# Patient Record
Sex: FEMALE | Race: OTHER | NOT HISPANIC OR LATINO | URBAN - METROPOLITAN AREA
[De-identification: names, ages, dates, MRNs, and addresses within clinical notes are randomized per-mention and may not be internally consistent; named-entity substitution may affect disease eponyms.]

---

## 2017-06-05 ENCOUNTER — OUTPATIENT (OUTPATIENT)
Dept: OUTPATIENT SERVICES | Facility: HOSPITAL | Age: 62
LOS: 1 days | End: 2017-06-05
Payer: COMMERCIAL

## 2017-06-05 DIAGNOSIS — E87.8 OTHER DISORDERS OF ELECTROLYTE AND FLUID BALANCE, NOT ELSEWHERE CLASSIFIED: ICD-10-CM

## 2017-06-05 DIAGNOSIS — D51.8 OTHER VITAMIN B12 DEFICIENCY ANEMIAS: ICD-10-CM

## 2017-06-05 DIAGNOSIS — I10 ESSENTIAL (PRIMARY) HYPERTENSION: ICD-10-CM

## 2017-06-05 DIAGNOSIS — D64.9 ANEMIA, UNSPECIFIED: ICD-10-CM

## 2017-06-05 DIAGNOSIS — E03.9 HYPOTHYROIDISM, UNSPECIFIED: ICD-10-CM

## 2017-06-05 PROCEDURE — 84439 ASSAY OF FREE THYROXINE: CPT

## 2017-06-05 PROCEDURE — 80053 COMPREHEN METABOLIC PANEL: CPT

## 2017-06-05 PROCEDURE — 83718 ASSAY OF LIPOPROTEIN: CPT

## 2017-06-05 PROCEDURE — 85027 COMPLETE CBC AUTOMATED: CPT

## 2017-06-05 PROCEDURE — 82306 VITAMIN D 25 HYDROXY: CPT

## 2017-06-05 PROCEDURE — 83721 ASSAY OF BLOOD LIPOPROTEIN: CPT

## 2017-06-05 PROCEDURE — 82465 ASSAY BLD/SERUM CHOLESTEROL: CPT

## 2017-06-05 PROCEDURE — 84443 ASSAY THYROID STIM HORMONE: CPT

## 2017-06-05 PROCEDURE — 84479 ASSAY OF THYROID (T3 OR T4): CPT

## 2017-07-19 ENCOUNTER — OUTPATIENT (OUTPATIENT)
Dept: OUTPATIENT SERVICES | Facility: HOSPITAL | Age: 62
LOS: 1 days | End: 2017-07-19
Payer: COMMERCIAL

## 2017-07-19 DIAGNOSIS — E11.9 TYPE 2 DIABETES MELLITUS WITHOUT COMPLICATIONS: ICD-10-CM

## 2017-07-19 PROCEDURE — 83036 HEMOGLOBIN GLYCOSYLATED A1C: CPT

## 2017-10-26 ENCOUNTER — OUTPATIENT (OUTPATIENT)
Dept: OUTPATIENT SERVICES | Facility: HOSPITAL | Age: 62
LOS: 1 days | End: 2017-10-26
Payer: COMMERCIAL

## 2017-10-26 DIAGNOSIS — Z00.00 ENCOUNTER FOR GENERAL ADULT MEDICAL EXAMINATION WITHOUT ABNORMAL FINDINGS: ICD-10-CM

## 2017-10-26 DIAGNOSIS — E11.9 TYPE 2 DIABETES MELLITUS WITHOUT COMPLICATIONS: ICD-10-CM

## 2017-10-26 PROCEDURE — 84443 ASSAY THYROID STIM HORMONE: CPT

## 2017-10-26 PROCEDURE — 83721 ASSAY OF BLOOD LIPOPROTEIN: CPT

## 2017-10-26 PROCEDURE — 82746 ASSAY OF FOLIC ACID SERUM: CPT

## 2017-10-26 PROCEDURE — 82306 VITAMIN D 25 HYDROXY: CPT

## 2017-10-26 PROCEDURE — 82607 VITAMIN B-12: CPT

## 2017-10-26 PROCEDURE — 84436 ASSAY OF TOTAL THYROXINE: CPT

## 2017-10-26 PROCEDURE — 84479 ASSAY OF THYROID (T3 OR T4): CPT

## 2017-10-26 PROCEDURE — 83718 ASSAY OF LIPOPROTEIN: CPT

## 2017-10-26 PROCEDURE — 82465 ASSAY BLD/SERUM CHOLESTEROL: CPT

## 2017-10-26 PROCEDURE — 85027 COMPLETE CBC AUTOMATED: CPT

## 2017-10-26 PROCEDURE — 83036 HEMOGLOBIN GLYCOSYLATED A1C: CPT

## 2017-10-26 PROCEDURE — 80048 BASIC METABOLIC PNL TOTAL CA: CPT

## 2019-10-25 ENCOUNTER — EMERGENCY (EMERGENCY)
Facility: HOSPITAL | Age: 64
LOS: 1 days | Discharge: HOME | End: 2019-10-25
Attending: EMERGENCY MEDICINE | Admitting: STUDENT IN AN ORGANIZED HEALTH CARE EDUCATION/TRAINING PROGRAM
Payer: COMMERCIAL

## 2019-10-25 VITALS
WEIGHT: 169.98 LBS | RESPIRATION RATE: 16 BRPM | SYSTOLIC BLOOD PRESSURE: 167 MMHG | HEART RATE: 80 BPM | OXYGEN SATURATION: 96 % | DIASTOLIC BLOOD PRESSURE: 71 MMHG | HEIGHT: 59 IN | TEMPERATURE: 98 F

## 2019-10-25 LAB
APTT BLD: 38.9 SEC — HIGH (ref 28.5–37)
BASOPHILS # BLD AUTO: 0.04 K/UL — SIGNIFICANT CHANGE UP (ref 0–0.2)
BASOPHILS NFR BLD AUTO: 0.5 % — SIGNIFICANT CHANGE UP (ref 0–2)
EOSINOPHIL # BLD AUTO: 0.28 K/UL — SIGNIFICANT CHANGE UP (ref 0–0.5)
EOSINOPHIL NFR BLD AUTO: 3.5 % — SIGNIFICANT CHANGE UP (ref 0–6)
HCT VFR BLD CALC: 32.6 % — LOW (ref 34.5–45)
HGB BLD-MCNC: 10.3 G/DL — LOW (ref 11.5–15.5)
IMM GRANULOCYTES NFR BLD AUTO: 0.4 % — SIGNIFICANT CHANGE UP (ref 0–1.5)
INR BLD: 0.97 RATIO — SIGNIFICANT CHANGE UP (ref 0.88–1.16)
LYMPHOCYTES # BLD AUTO: 1.65 K/UL — SIGNIFICANT CHANGE UP (ref 1–3.3)
LYMPHOCYTES # BLD AUTO: 20.7 % — SIGNIFICANT CHANGE UP (ref 13–44)
MCHC RBC-ENTMCNC: 21.6 PG — LOW (ref 27–34)
MCHC RBC-ENTMCNC: 31.6 GM/DL — LOW (ref 32–36)
MCV RBC AUTO: 68.5 FL — LOW (ref 80–100)
MONOCYTES # BLD AUTO: 0.68 K/UL — SIGNIFICANT CHANGE UP (ref 0–0.9)
MONOCYTES NFR BLD AUTO: 8.5 % — SIGNIFICANT CHANGE UP (ref 2–14)
NEUTROPHILS # BLD AUTO: 5.29 K/UL — SIGNIFICANT CHANGE UP (ref 1.8–7.4)
NEUTROPHILS NFR BLD AUTO: 66.4 % — SIGNIFICANT CHANGE UP (ref 43–77)
NRBC # BLD: 0 /100 WBCS — SIGNIFICANT CHANGE UP (ref 0–0)
PLATELET # BLD AUTO: 209 K/UL — SIGNIFICANT CHANGE UP (ref 150–400)
PROTHROM AB SERPL-ACNC: 11 SEC — SIGNIFICANT CHANGE UP (ref 10–12.9)
RBC # BLD: 4.76 M/UL — SIGNIFICANT CHANGE UP (ref 3.8–5.2)
RBC # FLD: 17.8 % — HIGH (ref 10.3–14.5)
WBC # BLD: 7.97 K/UL — SIGNIFICANT CHANGE UP (ref 3.8–10.5)
WBC # FLD AUTO: 7.97 K/UL — SIGNIFICANT CHANGE UP (ref 3.8–10.5)

## 2019-10-25 PROCEDURE — 99283 EMERGENCY DEPT VISIT LOW MDM: CPT | Mod: 25

## 2019-10-25 PROCEDURE — 93005 ELECTROCARDIOGRAM TRACING: CPT

## 2019-10-25 PROCEDURE — 36415 COLL VENOUS BLD VENIPUNCTURE: CPT

## 2019-10-25 PROCEDURE — 85610 PROTHROMBIN TIME: CPT

## 2019-10-25 PROCEDURE — 85730 THROMBOPLASTIN TIME PARTIAL: CPT

## 2019-10-25 PROCEDURE — 71046 X-RAY EXAM CHEST 2 VIEWS: CPT

## 2019-10-25 PROCEDURE — 99283 EMERGENCY DEPT VISIT LOW MDM: CPT

## 2019-10-25 PROCEDURE — 93010 ELECTROCARDIOGRAM REPORT: CPT

## 2019-10-25 PROCEDURE — 71046 X-RAY EXAM CHEST 2 VIEWS: CPT | Mod: 26

## 2019-10-25 PROCEDURE — 85027 COMPLETE CBC AUTOMATED: CPT

## 2019-10-25 NOTE — ED PROVIDER NOTE - OBJECTIVE STATEMENT
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 states she keeps telling employers that the chemicals make her feel sick but they continue to use them. patient states after she leaves the building her symtpoms improve within 30 minutes. additionally she notes a rash over her arms and legs intermittently. not itchy. saw pmd who has been recommending an allergist as well as a dermatologist which she has not yet done. states she is currently feeling well, but earlier today had symptoms while at the . 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 states she keeps telling employers that the chemicals make her feel sick but they continue to use them. patient states after she leaves the building her symptoms improve within 30 minutes. additionally she notes a rash over her arms and legs intermittently. not itchy. saw pmd who has been recommending an allergist as well as a dermatologist which she has not yet done. states she is currently feeling well, but earlier today had symptoms while at the .

## 2019-10-25 NOTE — ED PROVIDER NOTE - PATIENT PORTAL LINK FT
You can access the FollowMyHealth Patient Portal offered by [facility] by registering at the following website: [email or web address]. By joining [facility]’s FollowMyHealth portal, you will also be able to view your health information using other applications (apps) compatible with our system.

## 2019-10-25 NOTE — ED PROVIDER NOTE - ASSOCIATED PAIN OR INJURY
Alprazolam refill request        for Psychiatric Non-Scheduled (Anti-Anxiety)  Refill Protocol Appointment Criteria  · Appointment scheduled in the past 6 months or in the next 3 months  Recent Visits       Provider Department Primary Dx    8 months ago F
Verbal RX called in.
no discrete location documentation necessary

## 2019-10-25 NOTE — ED PROVIDER NOTE - CLINICAL SUMMARY MEDICAL DECISION MAKING FREE TEXT BOX
63 y/o female presents for sob, palpitations and chest pain while around specific chemicals at work for many months. symptoms resolve when she leaves the building. saw pmd advised see an allergist and dermatologist. well appearing, normal cardiopulmonary exam, rash on extremities that is nonblanching and questionably purpuric. will obtain ekg, cxr and plts/coags, likely needs outpatient follow up - Lyssa Dhaliwal PA-C

## 2019-10-25 NOTE — ED PROVIDER NOTE - PROGRESS NOTE DETAILS
MD spoke with patient regarding results, will recommend outpatient follow up. clinically not volume overloaded despite xray read - Lyssa Dhaliwal PA-C

## 2019-10-25 NOTE — ED ADULT NURSE NOTE - SUICIDE SCREENING QUESTION 3

## 2019-10-25 NOTE — ED PROVIDER NOTE - NSFOLLOWUPCLINICS_GEN_ALL_ED_FT
[facility] - [place]  Dermatology  [address] [place] [address]  Phone: [phone]  Fax: [phone]    [facility] - [place]  Dermatology  [address]  [place] [address]  Phone: [phone]  Fax: [phone]    [facility]  Allergy  [address] [place] [id number]  Phone: [phone]  Fax:   Follow Up Time:

## 2019-10-25 NOTE — ED PROVIDER NOTE - ATTENDING CONTRIBUTION TO CARE
pt with nonspecific SOB and b/l upper extremity chronic rash that she believes is due to cleaning chemical exposure at work.  pt states she is asymptomatic while at home.  imaging, ekg, labs unremarkable.  dc home with outpt derm fu.  pt advised to wear a mask while cleaning exposure due to possibility for pneumonitis if sensitive.

## 2019-10-25 NOTE — ED ADULT TRIAGE NOTE - WEIGHT IN LBS
169.9 Full Thickness Lip Wedge Repair (Flap) Text: Given the location of the defect and the proximity to free margins a full thickness wedge repair was deemed most appropriate.  Using a sterile surgical marker, the appropriate repair was drawn incorporating the defect and placing the expected incisions perpendicular to the vermilion border.  The vermilion border was also meticulously outlined to ensure appropriate reapproximation during the repair.  The area thus outlined was incised through and through with a #15 scalpel blade.  The muscularis and dermis were reaproximated with deep sutures following hemostasis. Care was taken to realign the vermilion border before proceeding with the superficial closure.  Once the vermilion was realigned the superfical and mucosal closure was finished.

## 2019-10-25 NOTE — ED PROVIDER NOTE - NSFOLLOWUPINSTRUCTIONS_ED_ALL_ED_FT
Follow up with your Primary Care Physician within the next 2-3 days  Bring a copy of your test results with you to your appointment  Continue your current medication regimen  Return to the Emergency Room if you experience new or worsening symptoms  avoid triggers for your trouble breathing  Follow up with an allergist  Follow up with a dermatologist regarding your rash Follow up with your Primary Care Physician within the next 2-3 days  Bring a copy of your test results with you to your appointment  Continue your current medication regimen  Return to the Emergency Room if you experience new or worsening symptoms  avoid triggers for your trouble breathing, chest pain, fainting  Follow up with an allergist  Follow up with a dermatologist regarding your rash

## 2019-10-25 NOTE — ED ADULT NURSE NOTE - OBJECTIVE STATEMENT
received pt in bed #6b Pt A&O pt with palpitations, chest pain and shortness of breath for months when shes at work & they use chemicals. .pt also  dry skin/rash x 6 months. Pt was instructed to see allergist & dermatologist by PMD but pt never did received pt in bed #6b Pt A&O pt with palpitations, chest pain and shortness of breath for 6 months when shes at work & they use chemicals. .pt also  dry skin/rash x 6 months. Pt was instructed to see allergist & dermatologist by PMD but pt never did. Pt denies any complaints @ this time States she only has symptoms when she is in the building & cleaning the floors

## 2023-09-11 NOTE — ED ADULT TRIAGE NOTE - TEMPERATURE IN FAHRENHEIT (DEGREES F)
"Chief Complaint  Heartburn and Abdominal Pain    Subjective          History of Present Illness    Vanessa Giang is a  46 y.o. female presents for follow-up on Vasquez's esophagus and dyspepsia with complaints of abdominal pain.  She is a patient of Dr. Stevens last seen on 8/10/2022.  She is new to me.    She is on omeprazole 20 mg daily which has helped the reflux.  She has a history of Vasquez's esophagus diagnosed on EGD several years ago with [facility] and underwent ablation.    Still with chronic LUQ pain that can radiate to the epigastrum and down the left side. Pain is intermittent, unable to find a trigger, uncertain if food is triggering this.  Chronic nausea, no vomting.  She tried gluten-free diet and vegan diet which did not seem to help.    She has constipation, taking miralax, prune juice which does help but not taking daily. When she does go, she will have mostly soft thin stools. Some lower abdomen cramping despite hysterectomy. \"Gas pains\" improve with BM but not other abdominal pain.  Eggs can cause diarrhea. She eats a lot of greens, kale. She tried colace and senna which caused nausea and didn't seem to help.  Denies melena hematochezia, abnormal weight loss.    8/31/2022 EGD LA Grade A esophagitis, irregular Z-line, otherwise unremarkable.  Pathology showed normal duodenal biopsies.  Chronic focally active gastritis, negative H. pylori.  Mild reflux esophagitis.      6/16/2022 noncontrasted CT scan for abdominal pain, diarrhea showed uterine mass, fatty liver, mild hepatomegaly, left ovarian cyst.  No acute abnormalities or explanations for symptoms.    Never had colonoscopy.     She has h/o Cushing's disease and pituitary tumor removed 4/22.     Objective   Vital Signs:   /83 (BP Location: Left arm, Patient Position: Sitting, Cuff Size: Adult)   Pulse 60   Temp 97.2 °F (36.2 °C) (Temporal)   Ht 170.2 cm (67\")   Wt 100 kg (221 lb)   SpO2 96%   BMI 34.61 kg/m²   "     Physical Exam  Vitals reviewed.   Constitutional:       General: She is awake. She is not in acute distress.     Appearance: Normal appearance. She is well-developed and well-groomed.   HENT:      Head: Normocephalic.   Pulmonary:      Effort: Pulmonary effort is normal. No respiratory distress.   Abdominal:      General: Abdomen is flat. Bowel sounds are normal. There is no distension.      Palpations: Abdomen is soft. There is no hepatomegaly or mass.      Tenderness: There is generalized abdominal tenderness. There is no guarding or rebound.      Comments: Tenderness worse in the LUQ   Skin:     Coloration: Skin is not pale.   Neurological:      Mental Status: She is alert and oriented to person, place, and time.      Gait: Gait is intact.   Psychiatric:         Mood and Affect: Mood and affect normal.         Speech: Speech normal.         Behavior: Behavior is cooperative.         Judgment: Judgment normal.        Result Review :             Assessment and Plan    Diagnoses and all orders for this visit:    1. Left upper quadrant abdominal pain (Primary)  -     NM Gastric Emptying    2. Chronic idiopathic constipation    3. Nausea  -     NM Gastric Emptying    4. Vasquez's esophagus without dysplasia  -     NM Gastric Emptying    5. Encounter for screening for malignant neoplasm of colon  -     Case Request; Standing  -     Implement Anesthesia orders day of procedure.; Standing  -     Obtain informed consent; Standing  -     Verify bowel prep was successful; Standing  -     Give tap water enema if bowel prep was insufficient; Standing  -     Case Request    Other orders  -     omeprazole (priLOSEC) 20 MG capsule; Take 1 capsule by mouth Daily.  Dispense: 90 capsule; Refill: 1    Recommend proceeding with gastric emptying scan for further work-up of her left upper quadrant pain and nausea.  Continue PPI.    She seems to eat a lot of fiber, continue this, recommended she get about 25 g of fiber per day.  98 Recommend she take something daily to keep her bowel movements regular--we discussed taking MiraLAX daily or eating prunes daily which she prefers.    Recommend Colonoscopy for screening after turning 47 in November. She had to do a miralax prep prior to hysterectomy which caused vomiting and dehydration.  We will plan for low volume prep.    Follow Up   Return in about 2 months (around 11/10/2023) for Jud.    Jerrica dictation used throughout this note.     Jud Jang PA-C